# Patient Record
Sex: MALE | Race: WHITE | URBAN - METROPOLITAN AREA
[De-identification: names, ages, dates, MRNs, and addresses within clinical notes are randomized per-mention and may not be internally consistent; named-entity substitution may affect disease eponyms.]

---

## 2017-08-03 ENCOUNTER — TRANSFERRED RECORDS (OUTPATIENT)
Dept: HEALTH INFORMATION MANAGEMENT | Facility: CLINIC | Age: 82
End: 2017-08-03

## 2017-08-15 ENCOUNTER — TRANSFERRED RECORDS (OUTPATIENT)
Dept: HEALTH INFORMATION MANAGEMENT | Facility: CLINIC | Age: 82
End: 2017-08-15

## 2017-08-18 ENCOUNTER — TRANSFERRED RECORDS (OUTPATIENT)
Dept: HEALTH INFORMATION MANAGEMENT | Facility: CLINIC | Age: 82
End: 2017-08-18

## 2017-08-29 ENCOUNTER — PRE VISIT (OUTPATIENT)
Dept: OTOLARYNGOLOGY | Facility: CLINIC | Age: 82
End: 2017-08-29

## 2017-08-29 NOTE — TELEPHONE ENCOUNTER
1.  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  Referring provider: JULIAN BAILEY   3.  Call to patient (Yes / No - short description): no, referral   4.  Previous care at / records requested from: [facility]   5.  Other:Records received from [facility].   Included  Office notes:8/24/17(phone call), 8/15/17, 8/3/17, 6/2/17  Radiology reports: MRI head brain iacs on 8/18/17- image in pacs :  Other: audiogram 8/3/17, med list, labs

## 2017-09-21 ENCOUNTER — OFFICE VISIT (OUTPATIENT)
Dept: OTOLARYNGOLOGY | Facility: CLINIC | Age: 82
End: 2017-09-21

## 2017-09-21 DIAGNOSIS — D32.0: Primary | ICD-10-CM

## 2017-09-21 DIAGNOSIS — H90.3 ASYMMETRICAL SENSORINEURAL HEARING LOSS: Primary | ICD-10-CM

## 2017-09-21 RX ORDER — HYDROCHLOROTHIAZIDE 12.5 MG/1
CAPSULE ORAL
COMMUNITY
Start: 2016-12-05

## 2017-09-21 RX ORDER — VITAMIN E 268 MG
CAPSULE ORAL
COMMUNITY
Start: 2014-10-30

## 2017-09-21 RX ORDER — CYANOCOBALAMIN (VITAMIN B-12) 1000 MCG
TABLET, EXTENDED RELEASE ORAL
COMMUNITY
Start: 2014-10-30

## 2017-09-21 ASSESSMENT — PAIN SCALES - GENERAL: PAINLEVEL: NO PAIN (0)

## 2017-09-21 NOTE — NURSING NOTE
Chief Complaint   Patient presents with     Consult     sensoneaural hearing loss      Hammad Rene

## 2017-09-21 NOTE — MR AVS SNAPSHOT
After Visit Summary   9/21/2017    Elder Marie    MRN: [id number]           Patient Information     Date Of Birth          1935        Visit Information        Provider Department      9/21/2017 9:30 AM Franny Guerrier MD [facility] Ear Nose and Throat        Care Instructions    You will have a MRI and audio in 6 months- you may do this locally. We will order and obtain for Dr. Guerrier to review. You will be called with the results.   Please call our clinic for any questions,concerns,or worsening symptoms.      Clinic #[phone]       Option 3  for the triage nurse.  [facility] ENT Nurse  [phone]          Follow-ups after your visit        Your next 10 appointments already scheduled     Sep 21, 2017 11:00 AM CDT   Walk In From ENT with Ysabel Birch [facility] ([facility])    [address] [place] [id number] [phone]              Who to contact     Please call your clinic at [phone] to:    Ask questions about your health    Make or cancel appointments    Discuss your medicines    Learn about your test results    Speak to your doctor   If you have compliments or concerns about an experience at your clinic, or if you wish to file a complaint, please contact [facility] Patient Relations at [phone] or email us at [email or web address]         Additional Information About Your Visit        MyChart Information     [employer] gives you secure access to your electronic health record. If you see a primary care provider, you can also send messages to your care team and make appointments. If you have questions, please call your primary care clinic.  If you do not have a primary care provider, please call [phone] and they will assist you.      [employer] is an electronic gateway that provides easy, online access to your medical records. With [employer], you can request a clinic appointment, read  your test results, renew a prescription or communicate with your care team.     To access your existing account, please contact your [facility] or call [phone] for assistance.        Care EveryWhere ID     This is your Care EveryWhere ID. This could be used by other organizations to access your [place] medical records  [id number]         Blood Pressure from Last 3 Encounters:   04/17/14 160/83    Weight from Last 3 Encounters:   No data found for Wt              Today, you had the following     No orders found for display       Primary Care Provider    None Specified       No primary provider on file.        Equal Access to Services     [facility]: Hadii aad angel hadprabhjot Soisauro, waaxda luqadaha, qaybta kaalmalindsey miller, jeison villalpando . So [facility] [phone].    ATENCIÓN: Si habla español, tiene a johansen disposición servicios gratuitos de asistencia lingüística. Llame al [phone].    We comply with applicable federal civil rights laws and [place] laws. We do not discriminate on the basis of race, color, national origin, age, disability sex, sexual orientation or gender identity.            Thank you!     Thank you for choosing [facility] EAR NOSE AND THROAT  for your care. Our goal is always to provide you with excellent care. Hearing back from our patients is one way we can continue to improve our services. Please take a few minutes to complete the written survey that you may receive in the mail after your visit with us. Thank you!             Your Updated Medication List - Protect others around you: Learn how to safely use, store and throw away your medicines at www.disposemymeds.org.          This list is accurate as of: 9/21/17 10:34 AM.  Always use your most recent med list.                   Brand Name Dispense Instructions for use Diagnosis    aspirin 81 MG EC tablet           atorvastatin 10 MG tablet    LIPITOR    30 tablet    Take 1  tablet (10 mg) by mouth daily        flaxseed oil 1000 MG Caps           FLOMAX 0.4 MG capsule   Generic drug:  tamsulosin     30 capsule    Take 1 capsule (0.4 mg) by mouth daily        gemfibrozil 600 MG tablet    LOPID    60 tablet    Take 1 tablet (600 mg) by mouth 2 times daily        hydrochlorothiazide 12.5 MG capsule    MICROZIDE          lisinopril 10 MG tablet    PRINIVIL/ZESTRIL    30 tablet    Take 1 tablet (10 mg) by mouth daily        metFORMIN 1000 MG tablet    GLUCOPHAGE    180 tablet    Take 1 tablet (1,000 mg) by mouth 2 times daily (with meals)        Multi-vitamin Tabs tablet     100 tablet    Take 1 tablet by mouth daily        omeprazole 20 MG tablet     90 tablet    Take 1 tablet (20 mg) by mouth daily Take 30-60 minutes before a meal.        PROBIOTIC DAILY PO           Selenium 200 MCG Caps           Vitamin D3 1000 UNIT/SPRAY Liqd

## 2017-09-21 NOTE — LETTER
9/21/2017       RE: Elder Marie  [address], [place] [address]     Dear Colleague,    Thank you for referring your patient, Elder Marie, to the [facility] EAR NOSE AND THROAT at [facility]. Please see a copy of my visit note below.    Elder Marie is seen in consultation from Dr. Peterson.  He is a 82 year old male being seen for asymmetrical hearing loss.  Patient reports he noticed that his left hearing was getting worse quicker than his right. 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 When he got his audiogram and confirmed his left hearing was worse than his right he got in immediately for MRI. The MRI shows a small likely meningioma on the left. He is here today to discuss management strategies.   He denies any other symptoms aside from the hearing loss. He denies vertigo, imbalance, facial weakness or numbness.     Past Medical History:   Diagnosis Date     Malignant melanoma (H)    DM II on metformin,  HTN on amlodipine, asa, lipitor, lisinopril, HCTZ    PSH: hernia repair many years ago, no issues with bleeding or anesthesia    No family history on file.    Social History   Substance Use Topics     Smoking status: Former Smoker     Smokeless tobacco: Never Used     Alcohol use No   Use to smoke tobacco from pipe, quit in 1990.    Patient Supplied Answers to Review of Systems  The remainder of the 10 point review of systems is otherwise negative.    Physical examination:  Constitutional:  In no acute distress, appears stated age  Eyes:  Extraocular movements intact, no spontaneous nystagmus  Ears:  Both EACs, TMs are healthy in appearance. Crowder is midline, Rinne on right shows air>bone, rinne on left shows air>bone and bone is heard better on right  Respiratory:  No increased work of breathing, wheezing or stridor  Musculoskeletal:  Good upper extremity strength  Skin:  No rashes on the head and neck  Neurologic:   House Brackman 1/6 bilaterally, ambulating normally, Romberg and finger to nose show good balance and coordination   Psychiatric:  Alert, normal affect, answering questions appropriately    Audiogram:  OSH audiogram from 8/3/17 is reviewed and shows mild sloping to severe SNHL bilaterally with the left worse than the right throughout the mid frequencies with 96% speech discrimination on the right, 92% on the left.    Imaging: MRI brain with contrast from 8/18/17 shows a ~9mm by ~15.9mm left CPA mass that is just inferior to the IAC in the CPA. There are dural tails on either side. There is enhancement of the dura entering the left IAC and in the IAC. No other abnormalities noted.    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 Given the slow growing nature of meningiomas, lack of disabling symptoms, and the patient's age we recommend surveillance. We did discuss the options of both surveillance and surgery. The patient is happy to proceed with surveillance at this point, but requests that the first repeat MRI be in 6 months rather than one year. We can accommodate this. He will have his MRI and audiogram done in [facility] and have it sent here. We can give him a call with the results and the recommendations at that time.     Patient seen and assessed with Dr. Guerrier.    Jen Ríos MD  Otolaryngology PGY2    I, Franny Guerrier, saw this patient with the resident/fellow and agree with the resident s findings and plan of care as documented in the resident s/fellow s note. I was present for the entire procedure.    Franny Guerrier MD

## 2017-09-21 NOTE — PROGRESS NOTES
Elder ROBINS Frank is seen in consultation from Dr. Peterson.  He is a 82 year old male being seen for asymmetrical hearing loss.  Patient reports he noticed that his left hearing was getting worse quicker than his right. 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 When he got his audiogram and confirmed his left hearing was worse than his right he got in immediately for MRI. The MRI shows a small likely meningioma on the left. He is here today to discuss management strategies.   He denies any other symptoms aside from the hearing loss. He denies vertigo, imbalance, facial weakness or numbness.     Past Medical History:   Diagnosis Date     Malignant melanoma (H)    DM II on metformin,  HTN on amlodipine, asa, lipitor, lisinopril, HCTZ    PSH: hernia repair many years ago, no issues with bleeding or anesthesia    No family history on file.    Social History   Substance Use Topics     Smoking status: Former Smoker     Smokeless tobacco: Never Used     Alcohol use No   Use to smoke tobacco from pipe, quit in 1990.    Patient Supplied Answers to Review of Systems  The remainder of the 10 point review of systems is otherwise negative.    Physical examination:  Constitutional:  In no acute distress, appears stated age  Eyes:  Extraocular movements intact, no spontaneous nystagmus  Ears:  Both EACs, TMs are healthy in appearance. Crowder is midline, Rinne on right shows air>bone, rinne on left shows air>bone and bone is heard better on right  Respiratory:  No increased work of breathing, wheezing or stridor  Musculoskeletal:  Good upper extremity strength  Skin:  No rashes on the head and neck  Neurologic:  House Brackman 1/6 bilaterally, ambulating normally, Romberg and finger to nose show good balance and coordination   Psychiatric:  Alert, normal affect, answering questions appropriately    Audiogram:  OSH audiogram from 8/3/17 is reviewed and shows mild sloping to severe SNHL  bilaterally with the left worse than the right throughout the mid frequencies with 96% speech discrimination on the right, 92% on the left.    Imaging: MRI brain with contrast from 8/18/17 shows a ~9mm by ~15.9mm left CPA mass that is just inferior to the IAC in the CPA. There are dural tails on either side. There is enhancement of the dura entering the left IAC and in the IAC. No other abnormalities noted.    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 Given the slow growing nature of meningiomas, lack of disabling symptoms, and the patient's age we recommend surveillance. We did discuss the options of both surveillance and surgery. The patient is happy to proceed with surveillance at this point, but requests that the first repeat MRI be in 6 months rather than one year. We can accommodate this. He will have his MRI and audiogram done in [facility] and have it sent here. We can give him a call with the results and the recommendations at that time.     Patient seen and assessed with Dr. Guerrier.    Jen Ríos MD  Otolaryngology PGY2    I, Franny Guerrier, saw this patient with the resident/fellow and agree with the resident s findings and plan of care as documented in the resident s/fellow s note. I was present for the entire procedure.    Franny Guerrier MD

## 2017-09-21 NOTE — PATIENT INSTRUCTIONS
You will have a MRI and audio in 6 months- you may do this locally. We will order and obtain for Dr. Guerrier to review. You will be called with the results.   Please call our clinic for any questions,concerns,or worsening symptoms.      Clinic #[phone]       Option 3  for the triage nurse.  [facility] ENT Nurse  [phone]

## 2018-03-22 ENCOUNTER — TRANSFERRED RECORDS (OUTPATIENT)
Dept: HEALTH INFORMATION MANAGEMENT | Facility: CLINIC | Age: 83
End: 2018-03-22

## 2018-04-18 ENCOUNTER — CARE COORDINATION (OUTPATIENT)
Dept: OTOLARYNGOLOGY | Facility: CLINIC | Age: 83
End: 2018-04-18

## 2018-04-18 NOTE — PROGRESS NOTES
Dr. Guerrier has reviewed the MRI done 3-2-18 at [facility]- Stable MRI when compared with previous MRI.  Audio done 3-22-18, left word discrimination decrease.  Repeat MRI/audio in one year.  Pt notified and understood.  Flavia Hinkle RN  ENT Care Coordinator   Otology  [phone]  4/18/2018 3:57 PM

## 2019-11-08 ENCOUNTER — HEALTH MAINTENANCE LETTER (OUTPATIENT)
Age: 84
End: 2019-11-08

## 2020-02-23 ENCOUNTER — HEALTH MAINTENANCE LETTER (OUTPATIENT)
Age: 85
End: 2020-02-23

## 2020-12-06 ENCOUNTER — HEALTH MAINTENANCE LETTER (OUTPATIENT)
Age: 85
End: 2020-12-06

## 2021-04-11 ENCOUNTER — HEALTH MAINTENANCE LETTER (OUTPATIENT)
Age: 86
End: 2021-04-11

## 2021-09-26 ENCOUNTER — HEALTH MAINTENANCE LETTER (OUTPATIENT)
Age: 86
End: 2021-09-26

## 2022-05-07 ENCOUNTER — HEALTH MAINTENANCE LETTER (OUTPATIENT)
Age: 87
End: 2022-05-07

## 2023-04-22 ENCOUNTER — HEALTH MAINTENANCE LETTER (OUTPATIENT)
Age: 88
End: 2023-04-22

## 2023-06-02 ENCOUNTER — HEALTH MAINTENANCE LETTER (OUTPATIENT)
Age: 88
End: 2023-06-02

## 2025-03-19 ENCOUNTER — OFFICE VISIT (OUTPATIENT)
Dept: FAMILY MEDICINE | Facility: CLINIC | Age: 89
End: 2025-03-19
Payer: COMMERCIAL

## 2025-03-19 VITALS
WEIGHT: 196 LBS | DIASTOLIC BLOOD PRESSURE: 68 MMHG | TEMPERATURE: 97.5 F | SYSTOLIC BLOOD PRESSURE: 120 MMHG | OXYGEN SATURATION: 97 % | HEART RATE: 72 BPM | RESPIRATION RATE: 18 BRPM

## 2025-03-19 DIAGNOSIS — Z79.899 MEDICATION MANAGEMENT: Primary | ICD-10-CM

## 2025-03-19 DIAGNOSIS — T16.2XXA FOREIGN BODY OF LEFT EAR, INITIAL ENCOUNTER: Primary | ICD-10-CM

## 2025-03-19 PROCEDURE — 1126F AMNT PAIN NOTED NONE PRSNT: CPT | Performed by: NURSE PRACTITIONER

## 2025-03-19 PROCEDURE — 3078F DIAST BP <80 MM HG: CPT | Performed by: NURSE PRACTITIONER

## 2025-03-19 PROCEDURE — 99202 OFFICE O/P NEW SF 15 MIN: CPT | Performed by: NURSE PRACTITIONER

## 2025-03-19 PROCEDURE — 3074F SYST BP LT 130 MM HG: CPT | Performed by: NURSE PRACTITIONER

## 2025-03-19 RX ORDER — LINAGLIPTIN 5 MG/1
1 TABLET, FILM COATED ORAL DAILY
COMMUNITY
Start: 2025-01-22

## 2025-03-19 RX ORDER — LISINOPRIL 40 MG/1
TABLET ORAL
COMMUNITY
Start: 2025-01-05

## 2025-03-19 RX ORDER — ATORVASTATIN CALCIUM 20 MG/1
TABLET, FILM COATED ORAL
COMMUNITY
Start: 2025-02-13

## 2025-03-19 RX ORDER — EMPAGLIFLOZIN 25 MG/1
1 TABLET, FILM COATED ORAL DAILY
COMMUNITY
Start: 2025-01-22

## 2025-03-19 RX ORDER — BLOOD SUGAR DIAGNOSTIC
STRIP MISCELLANEOUS
COMMUNITY
Start: 2025-01-31

## 2025-03-19 ASSESSMENT — PAIN SCALES - GENERAL: PAINLEVEL_OUTOF10: NO PAIN (0)

## 2025-03-30 ENCOUNTER — HEALTH MAINTENANCE LETTER (OUTPATIENT)
Age: 89
End: 2025-03-30

## 2025-04-02 ENCOUNTER — TELEPHONE (OUTPATIENT)
Dept: PHARMACY | Facility: OTHER | Age: 89
End: 2025-04-02
Payer: COMMERCIAL

## 2025-04-02 NOTE — TELEPHONE ENCOUNTER
KEELY Recruitment: [facility] insurance     Referral outreach attempt #1 on April 2, 2025      Outcome: left voicemail- Call back number [phone] and MyChart message sent    See Nick  [facility]   [phone]

## 2025-05-20 ENCOUNTER — TELEPHONE (OUTPATIENT)
Dept: FAMILY MEDICINE | Facility: CLINIC | Age: 89
End: 2025-05-20
Payer: COMMERCIAL

## 2025-05-20 NOTE — TELEPHONE ENCOUNTER
Patient Quality Outreach    Patient is due for the following:   Hypertension -  BP check    Action(s) Taken:   Patient is not an established [facility] patient per patient; does not want any further outreach.    Type of outreach:    Patient requests NO further contact/outreach    Questions for provider review:    None         Jennifer English CMA  Chart routed to None.